# Patient Record
Sex: FEMALE | Race: WHITE | URBAN - METROPOLITAN AREA
[De-identification: names, ages, dates, MRNs, and addresses within clinical notes are randomized per-mention and may not be internally consistent; named-entity substitution may affect disease eponyms.]

---

## 2018-01-11 ENCOUNTER — OUTPATIENT (OUTPATIENT)
Dept: OUTPATIENT SERVICES | Facility: HOSPITAL | Age: 68
LOS: 1 days | Discharge: HOME | End: 2018-01-11

## 2018-01-17 DIAGNOSIS — H25.89 OTHER AGE-RELATED CATARACT: ICD-10-CM

## 2018-01-18 ENCOUNTER — OUTPATIENT (OUTPATIENT)
Dept: OUTPATIENT SERVICES | Facility: HOSPITAL | Age: 68
LOS: 1 days | Discharge: HOME | End: 2018-01-18

## 2018-01-24 DIAGNOSIS — H25.89 OTHER AGE-RELATED CATARACT: ICD-10-CM

## 2018-01-24 DIAGNOSIS — Z88.8 ALLERGY STATUS TO OTHER DRUGS, MEDICAMENTS AND BIOLOGICAL SUBSTANCES STATUS: ICD-10-CM

## 2018-01-24 DIAGNOSIS — E78.00 PURE HYPERCHOLESTEROLEMIA, UNSPECIFIED: ICD-10-CM

## 2019-09-12 PROBLEM — Z00.00 ENCOUNTER FOR PREVENTIVE HEALTH EXAMINATION: Status: ACTIVE | Noted: 2019-09-12

## 2021-08-02 ENCOUNTER — APPOINTMENT (OUTPATIENT)
Dept: CARDIOLOGY | Facility: CLINIC | Age: 71
End: 2021-08-02
Payer: MEDICARE

## 2021-08-02 VITALS
SYSTOLIC BLOOD PRESSURE: 120 MMHG | BODY MASS INDEX: 23.9 KG/M2 | WEIGHT: 140 LBS | DIASTOLIC BLOOD PRESSURE: 78 MMHG | HEIGHT: 64 IN | TEMPERATURE: 97.8 F | HEART RATE: 71 BPM

## 2021-08-02 DIAGNOSIS — E78.5 HYPERLIPIDEMIA, UNSPECIFIED: ICD-10-CM

## 2021-08-02 DIAGNOSIS — I35.1 NONRHEUMATIC AORTIC (VALVE) INSUFFICIENCY: ICD-10-CM

## 2021-08-02 DIAGNOSIS — I36.1 NONRHEUMATIC TRICUSPID (VALVE) INSUFFICIENCY: ICD-10-CM

## 2021-08-02 PROCEDURE — 93000 ELECTROCARDIOGRAM COMPLETE: CPT

## 2021-08-02 PROCEDURE — 99204 OFFICE O/P NEW MOD 45 MIN: CPT

## 2021-08-02 RX ORDER — ROSUVASTATIN CALCIUM 5 MG/1
5 TABLET, FILM COATED ORAL
Qty: 90 | Refills: 0 | Status: ACTIVE | COMMUNITY
Start: 2021-06-16

## 2021-08-02 NOTE — PHYSICAL EXAM

## 2021-08-02 NOTE — DISCUSSION/SUMMARY
[FreeTextEntry1] : Exercise stress echo\par Patient was instructed to target her T. Cholesterol to less than 200 mg/dl and LDL cholesterol to less than 100 mg/dl.\par She is at target goal with respect to her lipid levels.\par Maintain crestor\par Patient was advised on her repeat  BMP, CBC , fasting lipid profile and hepatic panel.\par RV in 2 weeks\par

## 2021-08-02 NOTE — HISTORY OF PRESENT ILLNESS
[FreeTextEntry1] : Mild AI\par Mild TR\par Hyperlipidemia now controlled on crestor 5 mg daily\par Familial history of heart disease, her sister has IHSS, but  as a result of pancreatic cancer.\par Patient denies a history of MI, angina, CHF, arrhythmia, TIA, CVA, syncope, DM, hypertension, smoking.\par PSurgHx: Left meniscus surgery 5 years ago left knee, bilateral cataract surgeries 5 years ago.

## 2021-08-02 NOTE — REASON FOR VISIT
[FreeTextEntry1] : 71 year old WF presents for initial Cardiology evaluation due to an episode of chest pain occurring several months ago. She has a history of mild aortic valve insufficiency with mild Tricuspid valve regurgitation and grade I diastolic dysfunction.

## 2021-08-23 ENCOUNTER — APPOINTMENT (OUTPATIENT)
Dept: CARDIOLOGY | Facility: CLINIC | Age: 71
End: 2021-08-23
Payer: MEDICARE

## 2021-08-23 PROCEDURE — 93320 DOPPLER ECHO COMPLETE: CPT

## 2021-08-23 PROCEDURE — 93351 STRESS TTE COMPLETE: CPT

## 2021-08-23 PROCEDURE — 93325 DOPPLER ECHO COLOR FLOW MAPG: CPT

## 2021-08-24 ENCOUNTER — TRANSCRIPTION ENCOUNTER (OUTPATIENT)
Age: 71
End: 2021-08-24

## 2021-12-16 ENCOUNTER — OUTPATIENT (OUTPATIENT)
Dept: OUTPATIENT SERVICES | Facility: HOSPITAL | Age: 71
LOS: 1 days | Discharge: HOME | End: 2021-12-16
Payer: MEDICARE

## 2021-12-16 DIAGNOSIS — Z12.31 ENCOUNTER FOR SCREENING MAMMOGRAM FOR MALIGNANT NEOPLASM OF BREAST: ICD-10-CM

## 2021-12-16 PROCEDURE — 77063 BREAST TOMOSYNTHESIS BI: CPT | Mod: 26

## 2021-12-16 PROCEDURE — 77067 SCR MAMMO BI INCL CAD: CPT | Mod: 26

## 2022-09-25 ENCOUNTER — NON-APPOINTMENT (OUTPATIENT)
Age: 72
End: 2022-09-25

## 2022-09-30 ENCOUNTER — APPOINTMENT (OUTPATIENT)
Dept: OBGYN | Facility: CLINIC | Age: 72
End: 2022-09-30

## 2022-09-30 VITALS
TEMPERATURE: 97.8 F | SYSTOLIC BLOOD PRESSURE: 118 MMHG | WEIGHT: 139 LBS | BODY MASS INDEX: 23.73 KG/M2 | HEIGHT: 64 IN | DIASTOLIC BLOOD PRESSURE: 66 MMHG | HEART RATE: 75 BPM

## 2022-09-30 DIAGNOSIS — Z78.9 OTHER SPECIFIED HEALTH STATUS: ICD-10-CM

## 2022-09-30 DIAGNOSIS — R92.1 MAMMOGRAPHIC CALCIFICATION FOUND ON DIAGNOSTIC IMAGING OF BREAST: ICD-10-CM

## 2022-09-30 DIAGNOSIS — Z71.89 OTHER SPECIFIED COUNSELING: ICD-10-CM

## 2022-09-30 DIAGNOSIS — Z87.42 PERSONAL HISTORY OF OTHER DISEASES OF THE FEMALE GENITAL TRACT: ICD-10-CM

## 2022-09-30 DIAGNOSIS — Z80.3 FAMILY HISTORY OF MALIGNANT NEOPLASM OF BREAST: ICD-10-CM

## 2022-09-30 DIAGNOSIS — Z87.442 PERSONAL HISTORY OF URINARY CALCULI: ICD-10-CM

## 2022-09-30 DIAGNOSIS — N95.2 POSTMENOPAUSAL ATROPHIC VAGINITIS: ICD-10-CM

## 2022-09-30 DIAGNOSIS — Z80.1 FAMILY HISTORY OF MALIGNANT NEOPLASM OF TRACHEA, BRONCHUS AND LUNG: ICD-10-CM

## 2022-09-30 DIAGNOSIS — N89.8 OTHER SPECIFIED NONINFLAMMATORY DISORDERS OF VAGINA: ICD-10-CM

## 2022-09-30 DIAGNOSIS — Z87.898 PERSONAL HISTORY OF OTHER SPECIFIED CONDITIONS: ICD-10-CM

## 2022-09-30 DIAGNOSIS — Z71.83 ENCOUNTER FOR NONPROCREATIVE GENETIC COUNSELING: ICD-10-CM

## 2022-09-30 DIAGNOSIS — Z80.0 FAMILY HISTORY OF MALIGNANT NEOPLASM OF DIGESTIVE ORGANS: ICD-10-CM

## 2022-09-30 DIAGNOSIS — Z86.69 PERSONAL HISTORY OF OTHER DISEASES OF THE NERVOUS SYSTEM AND SENSE ORGANS: ICD-10-CM

## 2022-09-30 DIAGNOSIS — Z87.39 PERSONAL HISTORY OF OTHER DISEASES OF THE MUSCULOSKELETAL SYSTEM AND CONNECTIVE TISSUE: ICD-10-CM

## 2022-09-30 DIAGNOSIS — Z86.39 PERSONAL HISTORY OF OTHER ENDOCRINE, NUTRITIONAL AND METABOLIC DISEASE: ICD-10-CM

## 2022-09-30 DIAGNOSIS — M19.90 UNSPECIFIED OSTEOARTHRITIS, UNSPECIFIED SITE: ICD-10-CM

## 2022-09-30 DIAGNOSIS — Z13.71 ENCOUNTER FOR NONPROCREATIVE GENETIC COUNSELING: ICD-10-CM

## 2022-09-30 DIAGNOSIS — N63.0 UNSPECIFIED LUMP IN UNSPECIFIED BREAST: ICD-10-CM

## 2022-09-30 DIAGNOSIS — Z87.19 PERSONAL HISTORY OF OTHER DISEASES OF THE DIGESTIVE SYSTEM: ICD-10-CM

## 2022-09-30 DIAGNOSIS — Z01.419 ENCOUNTER FOR GYNECOLOGICAL EXAMINATION (GENERAL) (ROUTINE) W/OUT ABNORMAL FINDINGS: ICD-10-CM

## 2022-09-30 DIAGNOSIS — N81.9 FEMALE GENITAL PROLAPSE, UNSPECIFIED: ICD-10-CM

## 2022-09-30 PROCEDURE — G0101: CPT

## 2022-10-02 PROBLEM — R92.1 BREAST CALCIFICATIONS: Status: RESOLVED | Noted: 2022-09-30 | Resolved: 2022-10-02

## 2022-10-02 PROBLEM — Z86.39 HISTORY OF THYROID NODULE: Status: RESOLVED | Noted: 2022-10-02 | Resolved: 2022-10-02

## 2022-10-02 PROBLEM — Z86.69 HISTORY OF CATARACT: Status: RESOLVED | Noted: 2022-09-30 | Resolved: 2022-10-02

## 2022-10-02 PROBLEM — Z87.42 HISTORY OF DYSPAREUNIA IN FEMALE: Status: RESOLVED | Noted: 2022-09-30 | Resolved: 2022-10-02

## 2022-10-02 PROBLEM — Z87.442 HISTORY OF RENAL STONE: Status: RESOLVED | Noted: 2022-10-02 | Resolved: 2022-10-02

## 2022-10-02 PROBLEM — Z01.419 WOMEN'S ANNUAL ROUTINE GYNECOLOGICAL EXAMINATION: Status: ACTIVE | Noted: 2022-09-30

## 2022-10-02 PROBLEM — N89.8 VAGINAL DRYNESS: Status: ACTIVE | Noted: 2022-09-30

## 2022-10-02 PROBLEM — Z87.42 HISTORY OF OVARIAN CYST: Status: RESOLVED | Noted: 2022-09-30 | Resolved: 2022-10-02

## 2022-10-02 PROBLEM — Z80.0 FAMILY HISTORY OF PANCREATIC CANCER: Status: ACTIVE | Noted: 2022-10-02

## 2022-10-02 PROBLEM — N63.0 BREAST NODULE: Status: RESOLVED | Noted: 2022-09-30 | Resolved: 2022-10-02

## 2022-10-02 PROBLEM — Z71.83 ENCOUNTER FOR NONPROCREATIVE GENETIC COUNSELING AND TESTING: Status: ACTIVE | Noted: 2022-10-02

## 2022-10-02 PROBLEM — Z80.3 FAMILY HISTORY OF MALIGNANT NEOPLASM OF BREAST: Status: ACTIVE | Noted: 2022-09-30

## 2022-10-02 PROBLEM — N81.9 PROLAPSE OF FEMALE PELVIC ORGANS: Status: ACTIVE | Noted: 2022-10-02

## 2022-10-02 PROBLEM — Z80.0 FAMILY HISTORY OF MALIGNANT NEOPLASM OF COLON: Status: ACTIVE | Noted: 2022-09-30

## 2022-10-02 PROBLEM — M19.90 OSTEOARTHRITIS: Status: RESOLVED | Noted: 2022-10-02 | Resolved: 2022-10-02

## 2022-10-02 PROBLEM — Z78.9 EXERCISES OCCASIONALLY: Status: ACTIVE | Noted: 2022-09-30

## 2022-10-02 PROBLEM — Z86.39 HISTORY OF HIGH CHOLESTEROL: Status: RESOLVED | Noted: 2022-09-30 | Resolved: 2022-10-02

## 2022-10-02 PROBLEM — Z87.39 HISTORY OF OSTEOPENIA: Status: RESOLVED | Noted: 2022-09-30 | Resolved: 2022-10-02

## 2022-10-02 PROBLEM — Z87.898 HISTORY OF URINARY INCONTINENCE: Status: RESOLVED | Noted: 2022-09-30 | Resolved: 2022-10-02

## 2022-10-02 PROBLEM — Z87.19 HISTORY OF HEMORRHOIDS: Status: RESOLVED | Noted: 2022-09-30 | Resolved: 2022-10-02

## 2022-10-02 PROBLEM — Z71.89 OTHER SPECIFIED COUNSELING: Status: ACTIVE | Noted: 2022-10-02

## 2022-10-02 PROBLEM — Z80.1 FAMILY HISTORY OF LUNG CANCER: Status: ACTIVE | Noted: 2022-09-30

## 2022-10-02 PROBLEM — N95.2 ATROPHIC VULVOVAGINITIS: Status: ACTIVE | Noted: 2022-10-02

## 2022-10-02 RX ORDER — CHROMIUM 200 MCG
TABLET ORAL
Refills: 0 | Status: ACTIVE | COMMUNITY

## 2022-10-02 RX ORDER — DEXBROMPHENIR/PSEUDOEPHED SULF 6 MG-120MG
TABLET, EXTENDED RELEASE 12 HR ORAL
Refills: 0 | Status: ACTIVE | COMMUNITY

## 2022-10-02 NOTE — DISCUSSION/SUMMARY
[FreeTextEntry1] : A: 72-year-old for annual GYN exam, vulvovaginal atrophy, pelvic organ prolapse, stress/urge incontinence\par \par P: GYN exam done\par Pain and bleeding precautions\par Prolapse management options discussed-patient not interested in any management at this time\par Safe sex practices\par Referral for breast mammogram given\par Hereditary cancer gene testing done-empower sent\par Encourage healthy diet and exercise\par Follow-up for routine exam or as needed

## 2022-10-02 NOTE — HISTORY OF PRESENT ILLNESS
[Patient reported bone density results were abnormal] : Patient reported bone density results were abnormal [LMP unknown] : LMP unknown [postmenopausal] : postmenopausal [Y] : Positive pregnancy history [unknown] : Patient is unsure of the date of her LMP [Menarche Age: ____] : age at menarche was [unfilled] [Currently In Menopause] : currently in menopause [Menopause Age: ____] : age at menopause was [unfilled] [Currently Active] : currently active [Men] : men [No] : No [Patient reported colonoscopy was normal] : Patient reported colonoscopy was normal [TextBox_4] : 72-year-old para 3-0-1-3 and menopause came for annual GYN exam.  She denies postmenopausal bleeding, pelvic pain, discharge, dysuria.  She does have occasional stress/urge incontinence but is not interested in intervention after counseling done.  She denies history of abnormal Pap, HPV, STDs or fibroids.  She does have a remote history of ovarian cysts but asymptomatic.  She is sexually active with 1 male partner-.\par \par Patient was counseled due to family history of multiple family members with cancer including breast, pancreas, colon and others.  We discussed hereditary cancer gene testing and patient states she would like to do today.  All questions answered satisfactorily. [Mammogramdate] : 2021 [BoneDensityDate] : 2021 [TextBox_37] : osteopenia  [ColonoscopyDate] : Many years ago [TextBox_43] : pending Cologuard  [PGHxTotal] : 4 [[facility]] : 3 [PGHxPremature] : 0 [PGHxAbortions] : 1 [[facility]soniving] : 3 [PGHxABInduced] : 0 [PGHxABSpont] : 1 [PGHxEctopic] : 0 [PGHxMultBirths] : 0 [FreeTextEntry1] :

## 2022-10-02 NOTE — PHYSICAL EXAM
[Appropriately responsive] : appropriately responsive [Alert] : alert [No Acute Distress] : no acute distress [No Lymphadenopathy] : no lymphadenopathy [Regular Rate Rhythm] : regular rate rhythm [Soft] : soft [Non-tender] : non-tender [Non-distended] : non-distended [No Mass] : no mass [Oriented x3] : oriented x3 [Examination Of The Breasts] : a normal appearance [No Discharge] : no discharge [No Masses] : no breast masses were palpable [Vulvar Atrophy] : vulvar atrophy [No Lesions] : no lesions  [Labia Majora] : normal [Labia Minora] : normal [Atrophy] : atrophy [Cystocele] : a cystocele [Rectocele] : a rectocele [Uterine Prolapse] : uterine prolapse [No Bleeding] : There was no active vaginal bleeding [Normal] : normal [Tenderness] : nontender [Enlarged ___ wks] : not enlarged [Mass ___ cm] : no uterine mass was palpated [Uterine Adnexae] : normal

## 2022-10-25 ENCOUNTER — NON-APPOINTMENT (OUTPATIENT)
Age: 72
End: 2022-10-25

## 2023-03-17 ENCOUNTER — APPOINTMENT (OUTPATIENT)
Dept: NEUROLOGY | Facility: CLINIC | Age: 73
End: 2023-03-17
Payer: MEDICARE

## 2023-03-17 VITALS
BODY MASS INDEX: 23.9 KG/M2 | DIASTOLIC BLOOD PRESSURE: 81 MMHG | HEIGHT: 64 IN | SYSTOLIC BLOOD PRESSURE: 120 MMHG | HEART RATE: 106 BPM | WEIGHT: 140 LBS

## 2023-03-17 DIAGNOSIS — S09.90XA UNSPECIFIED INJURY OF HEAD, INITIAL ENCOUNTER: ICD-10-CM

## 2023-03-17 DIAGNOSIS — R90.82 WHITE MATTER DISEASE, UNSPECIFIED: ICD-10-CM

## 2023-03-17 PROCEDURE — 99203 OFFICE O/P NEW LOW 30 MIN: CPT

## 2023-03-17 NOTE — REASON FOR VISIT
[Initial Evaluation] : an initial evaluation [FreeTextEntry1] : h [Consultation] : a consultation visit

## 2023-03-17 NOTE — HISTORY OF PRESENT ILLNESS
[FreeTextEntry1] :  is a 72 year old woman who comes in for a self inflicted injury that occurred 2 week ago. Patient hit her head on her microwave door. She reports of not losing consciousness. She went for an X-Ray. She reports of forgetting things often since. Symptoms of Post Concussion Syndrome. Patient does have hypercholesterolemia and is monitored with medication & is being treated for 2 years. Patient has trouble falling asleep at night. She has trouble going to sleep and staying asleep due to her husbands Sleep Apnea and wears his mask at night. Patient rodriguez show anatomy of Sleep Apnea. Denies of snoring. Denies of waking up wheezing & gasping for air. Denies of waking up feeling tired.\par \par Neurological Examination. -  Normal.

## 2023-03-17 NOTE — ASSESSMENT
[FreeTextEntry1] : white matter disease\par -MRI of brain result reviewed, mostly mild/moderate. no evidence of prior cva\par -advise to optimize risk factor for white matter disease such as hyperlipidemia, HTN, and etc.  \par \par \par Post Concussive Syndrome. \par -much improved, doing gait training with son who is a physical therapist\par \par \par \par \par \par \par \par I, Laura Hannon, Attest that this documentation has been prepared under the direction and in the presence of Provider Kyle Obrien DO\par \par Thank You for letting me assist in the management of this patient. \par \par Kyle Obrien DO\par Board Certified, Neurology\par  \par \par

## 2023-04-20 ENCOUNTER — OUTPATIENT (OUTPATIENT)
Dept: OUTPATIENT SERVICES | Facility: HOSPITAL | Age: 73
LOS: 1 days | End: 2023-04-20
Payer: MEDICARE

## 2023-04-20 DIAGNOSIS — Z12.31 ENCOUNTER FOR SCREENING MAMMOGRAM FOR MALIGNANT NEOPLASM OF BREAST: ICD-10-CM

## 2023-04-20 PROCEDURE — 77063 BREAST TOMOSYNTHESIS BI: CPT | Mod: 26

## 2023-04-20 PROCEDURE — 77067 SCR MAMMO BI INCL CAD: CPT

## 2023-04-20 PROCEDURE — 77063 BREAST TOMOSYNTHESIS BI: CPT

## 2023-04-20 PROCEDURE — 77067 SCR MAMMO BI INCL CAD: CPT | Mod: 26

## 2023-04-21 DIAGNOSIS — Z12.31 ENCOUNTER FOR SCREENING MAMMOGRAM FOR MALIGNANT NEOPLASM OF BREAST: ICD-10-CM

## 2023-07-28 ENCOUNTER — NON-APPOINTMENT (OUTPATIENT)
Age: 73
End: 2023-07-28

## 2024-02-12 ENCOUNTER — APPOINTMENT (OUTPATIENT)
Dept: NEUROLOGY | Facility: CLINIC | Age: 74
End: 2024-02-12

## 2024-03-11 ENCOUNTER — NON-APPOINTMENT (OUTPATIENT)
Age: 74
End: 2024-03-11

## 2024-07-17 ENCOUNTER — OUTPATIENT (OUTPATIENT)
Dept: OUTPATIENT SERVICES | Facility: HOSPITAL | Age: 74
LOS: 1 days | End: 2024-07-17
Payer: MEDICARE

## 2024-07-17 DIAGNOSIS — Z12.31 ENCOUNTER FOR SCREENING MAMMOGRAM FOR MALIGNANT NEOPLASM OF BREAST: ICD-10-CM

## 2024-07-17 PROCEDURE — 77067 SCR MAMMO BI INCL CAD: CPT

## 2024-07-17 PROCEDURE — 77067 SCR MAMMO BI INCL CAD: CPT | Mod: 26

## 2024-07-17 PROCEDURE — 77063 BREAST TOMOSYNTHESIS BI: CPT | Mod: 26

## 2024-07-17 PROCEDURE — 77063 BREAST TOMOSYNTHESIS BI: CPT

## 2024-07-18 DIAGNOSIS — Z12.31 ENCOUNTER FOR SCREENING MAMMOGRAM FOR MALIGNANT NEOPLASM OF BREAST: ICD-10-CM

## 2024-11-05 ENCOUNTER — APPOINTMENT (OUTPATIENT)
Dept: OBGYN | Facility: CLINIC | Age: 74
End: 2024-11-05

## 2025-01-03 ENCOUNTER — NON-APPOINTMENT (OUTPATIENT)
Age: 75
End: 2025-01-03

## 2025-01-03 ENCOUNTER — APPOINTMENT (OUTPATIENT)
Dept: OBGYN | Facility: CLINIC | Age: 75
End: 2025-01-03

## 2025-01-03 ENCOUNTER — LABORATORY RESULT (OUTPATIENT)
Age: 75
End: 2025-01-03

## 2025-01-03 VITALS
SYSTOLIC BLOOD PRESSURE: 116 MMHG | HEIGHT: 64 IN | DIASTOLIC BLOOD PRESSURE: 78 MMHG | WEIGHT: 138 LBS | TEMPERATURE: 98.6 F | HEART RATE: 77 BPM | BODY MASS INDEX: 23.56 KG/M2

## 2025-01-03 DIAGNOSIS — Z01.419 ENCOUNTER FOR GYNECOLOGICAL EXAMINATION (GENERAL) (ROUTINE) W/OUT ABNORMAL FINDINGS: ICD-10-CM

## 2025-01-03 DIAGNOSIS — N81.9 FEMALE GENITAL PROLAPSE, UNSPECIFIED: ICD-10-CM

## 2025-01-03 DIAGNOSIS — N95.2 POSTMENOPAUSAL ATROPHIC VAGINITIS: ICD-10-CM

## 2025-01-03 DIAGNOSIS — Z71.89 OTHER SPECIFIED COUNSELING: ICD-10-CM

## 2025-01-03 DIAGNOSIS — N89.8 OTHER SPECIFIED NONINFLAMMATORY DISORDERS OF VAGINA: ICD-10-CM

## 2025-01-03 DIAGNOSIS — Z12.4 ENCOUNTER FOR SCREENING FOR MALIGNANT NEOPLASM OF CERVIX: ICD-10-CM

## 2025-01-03 PROCEDURE — G0101: CPT

## 2025-01-11 LAB
CYTOLOGY CVX/VAG DOC THIN PREP: ABNORMAL
HPV HIGH+LOW RISK DNA PNL CVX: DETECTED

## 2025-01-22 ENCOUNTER — NON-APPOINTMENT (OUTPATIENT)
Age: 75
End: 2025-01-22

## 2025-07-07 ENCOUNTER — APPOINTMENT (OUTPATIENT)
Dept: OBGYN | Facility: CLINIC | Age: 75
End: 2025-07-07
Payer: MEDICARE

## 2025-07-07 ENCOUNTER — LABORATORY RESULT (OUTPATIENT)
Age: 75
End: 2025-07-07

## 2025-07-07 VITALS
BODY MASS INDEX: 23.73 KG/M2 | WEIGHT: 139 LBS | SYSTOLIC BLOOD PRESSURE: 122 MMHG | HEART RATE: 77 BPM | TEMPERATURE: 98.6 F | DIASTOLIC BLOOD PRESSURE: 77 MMHG | HEIGHT: 64 IN

## 2025-07-07 PROBLEM — Z71.2 ENCOUNTER TO DISCUSS TEST RESULTS: Status: ACTIVE | Noted: 2025-07-07

## 2025-07-07 PROBLEM — R31.29 MICROSCOPIC HEMATURIA: Status: ACTIVE | Noted: 2025-07-07

## 2025-07-07 PROBLEM — B97.7 HIGH RISK HPV INFECTION: Status: ACTIVE | Noted: 2025-07-07

## 2025-07-07 PROCEDURE — 99213 OFFICE O/P EST LOW 20 MIN: CPT

## 2025-07-10 LAB — HPV HIGH+LOW RISK DNA PNL CVX: DETECTED

## 2025-07-29 DIAGNOSIS — N85.9 NONINFLAMMATORY DISORDER OF UTERUS, UNSPECIFIED: ICD-10-CM

## 2025-08-01 ENCOUNTER — RESULT REVIEW (OUTPATIENT)
Age: 75
End: 2025-08-01

## 2025-08-01 ENCOUNTER — OUTPATIENT (OUTPATIENT)
Dept: OUTPATIENT SERVICES | Facility: HOSPITAL | Age: 75
LOS: 1 days | End: 2025-08-01
Payer: MEDICARE

## 2025-08-01 DIAGNOSIS — Z12.31 ENCOUNTER FOR SCREENING MAMMOGRAM FOR MALIGNANT NEOPLASM OF BREAST: ICD-10-CM

## 2025-08-01 PROCEDURE — 77063 BREAST TOMOSYNTHESIS BI: CPT | Mod: 26

## 2025-08-01 PROCEDURE — 77067 SCR MAMMO BI INCL CAD: CPT

## 2025-08-01 PROCEDURE — 77067 SCR MAMMO BI INCL CAD: CPT | Mod: 26

## 2025-08-01 PROCEDURE — 77063 BREAST TOMOSYNTHESIS BI: CPT

## 2025-08-02 DIAGNOSIS — Z12.31 ENCOUNTER FOR SCREENING MAMMOGRAM FOR MALIGNANT NEOPLASM OF BREAST: ICD-10-CM

## 2025-09-05 ENCOUNTER — NON-APPOINTMENT (OUTPATIENT)
Age: 75
End: 2025-09-05